# Patient Record
Sex: FEMALE | Race: WHITE | NOT HISPANIC OR LATINO | Employment: UNEMPLOYED | URBAN - METROPOLITAN AREA
[De-identification: names, ages, dates, MRNs, and addresses within clinical notes are randomized per-mention and may not be internally consistent; named-entity substitution may affect disease eponyms.]

---

## 2025-06-06 ENCOUNTER — APPOINTMENT (OUTPATIENT)
Dept: PRIMARY CARE | Facility: CLINIC | Age: 65
End: 2025-06-06
Payer: COMMERCIAL

## 2025-06-06 VITALS
HEART RATE: 65 BPM | TEMPERATURE: 97 F | BODY MASS INDEX: 24.16 KG/M2 | WEIGHT: 145 LBS | SYSTOLIC BLOOD PRESSURE: 152 MMHG | OXYGEN SATURATION: 98 % | DIASTOLIC BLOOD PRESSURE: 77 MMHG | HEIGHT: 65 IN

## 2025-06-06 DIAGNOSIS — L40.50 PSORIATIC ARTHRITIS (MULTI): ICD-10-CM

## 2025-06-06 DIAGNOSIS — E11.42 TYPE 2 DIABETES MELLITUS WITH DIABETIC POLYNEUROPATHY, WITHOUT LONG-TERM CURRENT USE OF INSULIN: ICD-10-CM

## 2025-06-06 DIAGNOSIS — I63.9 CEREBROVASCULAR ACCIDENT (CVA), UNSPECIFIED MECHANISM (MULTI): ICD-10-CM

## 2025-06-06 DIAGNOSIS — D50.8 IRON DEFICIENCY ANEMIA SECONDARY TO INADEQUATE DIETARY IRON INTAKE: ICD-10-CM

## 2025-06-06 DIAGNOSIS — F41.9 ANXIETY: ICD-10-CM

## 2025-06-06 DIAGNOSIS — Z76.89 ENCOUNTER TO ESTABLISH CARE WITH NEW PROVIDER: Primary | ICD-10-CM

## 2025-06-06 PROBLEM — D50.9 IRON DEFICIENCY ANEMIA: Status: ACTIVE | Noted: 2023-02-07

## 2025-06-06 PROBLEM — K21.9 GERD (GASTROESOPHAGEAL REFLUX DISEASE): Status: ACTIVE | Noted: 2025-06-06

## 2025-06-06 PROBLEM — E78.5 HYPERLIPIDEMIA: Status: ACTIVE | Noted: 2025-06-06

## 2025-06-06 PROBLEM — B02.9 SHINGLES: Status: ACTIVE | Noted: 2025-06-06

## 2025-06-06 PROBLEM — F32.A DEPRESSION: Status: ACTIVE | Noted: 2025-06-06

## 2025-06-06 PROBLEM — T45.4X5A ADVERSE EFFECT OF IRON AND ITS COMPOUNDS, INITIAL ENCOUNTER: Status: ACTIVE | Noted: 2023-02-07

## 2025-06-06 PROBLEM — E04.2 MULTIPLE THYROID NODULES: Status: ACTIVE | Noted: 2025-06-06

## 2025-06-06 PROBLEM — M19.90 ARTHRITIS: Status: ACTIVE | Noted: 2025-06-06

## 2025-06-06 PROBLEM — E03.9 HYPOTHYROIDISM: Status: ACTIVE | Noted: 2025-06-06

## 2025-06-06 PROBLEM — I10 HYPERTENSION: Status: ACTIVE | Noted: 2025-06-06

## 2025-06-06 PROBLEM — M79.7 FIBROMYALGIA: Status: ACTIVE | Noted: 2025-06-06

## 2025-06-06 PROBLEM — H40.9 GLAUCOMA: Status: ACTIVE | Noted: 2025-06-06

## 2025-06-06 PROCEDURE — 4004F PT TOBACCO SCREEN RCVD TLK: CPT | Performed by: NURSE PRACTITIONER

## 2025-06-06 PROCEDURE — 3077F SYST BP >= 140 MM HG: CPT | Performed by: NURSE PRACTITIONER

## 2025-06-06 PROCEDURE — 3078F DIAST BP <80 MM HG: CPT | Performed by: NURSE PRACTITIONER

## 2025-06-06 PROCEDURE — 3008F BODY MASS INDEX DOCD: CPT | Performed by: NURSE PRACTITIONER

## 2025-06-06 PROCEDURE — 99214 OFFICE O/P EST MOD 30 MIN: CPT | Performed by: NURSE PRACTITIONER

## 2025-06-06 RX ORDER — LEVOTHYROXINE SODIUM 137 UG/1
137 TABLET ORAL DAILY
COMMUNITY

## 2025-06-06 RX ORDER — DULOXETIN HYDROCHLORIDE 60 MG/1
60 CAPSULE, DELAYED RELEASE ORAL DAILY
COMMUNITY

## 2025-06-06 RX ORDER — ERGOCALCIFEROL 1.25 MG/1
1 CAPSULE ORAL WEEKLY
COMMUNITY
Start: 2018-01-15

## 2025-06-06 RX ORDER — ATORVASTATIN CALCIUM 20 MG/1
20 TABLET, FILM COATED ORAL DAILY
COMMUNITY

## 2025-06-06 RX ORDER — GABAPENTIN 300 MG/1
300 CAPSULE ORAL 3 TIMES DAILY
COMMUNITY

## 2025-06-06 RX ORDER — DEXTROMETHORPHAN HYDROBROMIDE, GUAIFENESIN 5; 100 MG/5ML; MG/5ML
650 LIQUID ORAL EVERY 8 HOURS PRN
COMMUNITY

## 2025-06-06 RX ORDER — FLUTICASONE PROPIONATE 50 MCG
2 SPRAY, SUSPENSION (ML) NASAL DAILY
COMMUNITY
Start: 2019-01-28

## 2025-06-06 RX ORDER — NAPROXEN 500 MG/1
500 TABLET ORAL
COMMUNITY
Start: 2019-01-28

## 2025-06-06 RX ORDER — VALACYCLOVIR HYDROCHLORIDE 1 G/1
500 TABLET, FILM COATED ORAL DAILY
COMMUNITY
Start: 2025-05-19

## 2025-06-06 RX ORDER — IBUPROFEN 600 MG/1
600 TABLET, FILM COATED ORAL EVERY 8 HOURS PRN
COMMUNITY
Start: 2019-01-28

## 2025-06-06 RX ORDER — LEVOTHYROXINE SODIUM 125 UG/1
1 TABLET ORAL DAILY
COMMUNITY

## 2025-06-06 RX ORDER — LISINOPRIL AND HYDROCHLOROTHIAZIDE 20; 25 MG/1; MG/1
1 TABLET ORAL DAILY
COMMUNITY

## 2025-06-06 RX ORDER — LATANOPROST 50 UG/ML
1 SOLUTION/ DROPS OPHTHALMIC NIGHTLY
COMMUNITY
Start: 2021-05-10

## 2025-06-06 RX ORDER — TRAZODONE HYDROCHLORIDE 100 MG/1
100 TABLET ORAL NIGHTLY
COMMUNITY
Start: 2025-04-29 | End: 2025-07-28

## 2025-06-06 RX ORDER — HYDROCHLOROTHIAZIDE 25 MG/1
25 TABLET ORAL EVERY MORNING
COMMUNITY
Start: 2020-11-12 | End: 2021-02-10 | Stop reason: WASHOUT

## 2025-06-06 RX ORDER — DOCUSATE SODIUM 100 MG/1
100 CAPSULE, LIQUID FILLED ORAL 2 TIMES DAILY
COMMUNITY

## 2025-06-06 RX ORDER — HYDROXYCHLOROQUINE SULFATE 200 MG/1
1 TABLET ORAL 2 TIMES DAILY
COMMUNITY
Start: 2020-11-12 | End: 2021-02-10

## 2025-06-06 RX ORDER — ASPIRIN 325 MG
325 TABLET ORAL DAILY
COMMUNITY

## 2025-06-06 RX ORDER — FOLIC ACID 1 MG/1
1 TABLET ORAL DAILY
COMMUNITY

## 2025-06-06 RX ORDER — OMEPRAZOLE 20 MG/1
40 CAPSULE, DELAYED RELEASE ORAL
COMMUNITY
Start: 2019-01-28

## 2025-06-06 ASSESSMENT — ENCOUNTER SYMPTOMS
OCCASIONAL FEELINGS OF UNSTEADINESS: 0
LOSS OF SENSATION IN FEET: 0
DEPRESSION: 0

## 2025-06-06 NOTE — PROGRESS NOTES
"Subjective   Patient ID: Lita Lomas is a 64 y.o. female who presents for New Patient Visit (To [facility]).    HPI   This is a pleasant 63 y/o female with PMH Gastric Bypass, HTN, HLD, DM, GERD, Hypothyroid, Goiter, Anxiety, Depression, Fibro, Psoriatic arthritis, CVA, Hypertensive retinopathy, Visual pathway disorder, Glaucoma who presents to [facility]    Annual due in September    Current concerns  CVA- 2020, with right vision loss resolved, still with memory loss, inability to concentrate, forgetfulness. At the time had been working in [employer], she is an RN. Caused her to have to quit working and is now on disability. Still struggling with no longer working, misses it which we discussed today.  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     Smoker- 1.5 ppd x 30, not interested in quitting, no LDLCT noted, discussed and will order at Annual    S/p gastric bypass- 2005, at the time weighed 450#, no longer treating diabetes    Iron anemia- history of transfusion, cannot tolerate po iron    , spouse on kidney transplant list, has had 3 calls with no match  no  Kids    Diet healthy  Caffeine 1 some days  Water 64 oz  Exercise no, discussed  Alcohol  No      Mammogram 2024  Cologuard, no results found  Follows with Rheumatology Dr Pyle  Ophthalmology Dr Pennington    Review of Systems  Review of Systems   Constitutional: Negative.    Respiratory: Negative.     Cardiovascular: Negative.    Gastrointestinal: Negative.    All other systems reviewed and are negative.    Objective   /77 (BP Location: Left arm, Patient Position: Sitting)   Pulse 65   Temp 36.1 °C (97 °F)   Ht 1.651 m (5' 5\")   Wt 65.8 kg (145 lb)   SpO2 98%   BMI 24.13 kg/m²     Physical Exam  Vitals reviewed.   Constitutional:       Appearance: Normal appearance.   Cardiovascular:      Rate and Rhythm: Normal rate. Rhythm irregular.      Heart sounds: Normal heart sounds.   Pulmonary:     "  Effort: Pulmonary effort is normal.      Breath sounds: Normal breath sounds.   Musculoskeletal:         General: Normal range of motion.      Cervical back: Normal range of motion and neck supple.   Neurological:      Mental Status: She is alert.   Psychiatric:         Mood and Affect: Mood normal.         Assessment/Plan   Problem List Items Addressed This Visit           ICD-10-CM    Anxiety F41.9    Handouts provided and reviewed with patient, discussed           CVA (cerebral vascular accident) (Multi) I63.9    Psoriatic arthritis (Multi) L40.50    Encounter to establish care with new provider - Primary Z76.89    Meds, labs, chart reviewed  Follow up for Annual, fasting labs, screenings  Check iron         Iron deficiency anemia D50.9    Type 2 diabetes mellitus with diabetic polyneuropathy, without long-term current use of insulin E11.42    Resolved with Gastric bypass, down 300#

## 2025-06-06 NOTE — PATIENT INSTRUCTIONS
Health Maintenance  Continue to follow a healthy diet with fruits and vegetables at most meals.  Daily exercise is recommended as well as adding weights 3 times a week to maintain muscle mass and for bone health.  It is recommended you drink 6 to 8 glasses of water daily, more when you are exerting yourself or in hot weather.  Make sure you follow the health screening guidelines and keep up to date on your immunizations!    Experiencing occasional anxiety is a normal part of life    Anxiety is not always related to an underlying condition. It may be caused by:   * Stress from work, school, or personal relationships  * Emotional trauma   * Financial concerns   * Stress caused by a chronic or serious medical condition   * A major event or performance such as a wedding, move, birth  * Side effect of certain medications   * Alcohol consumption, drugs such as cocaine   * Lack of oxygen     Treatment for Anxiety can include  * Exercise daily   * Maintain a positive attitude   * Get enough sleep   * Learn what triggers your anxiety and alleviating it   * Eat a well balanced diet  * Practicing relaxation techniques such as yoga   * Stopping smoking   * Limiting consumption of caffeine and alcohol    See a doctor if you notice your anxiety is interfering with daily tasks. They can refer you to a Psychiatrist to evaluate if a medication would be helpful for you  This is especially important if your anxiety is accompanied by Insomnia, Depression, or Suicidal thoughts

## 2025-10-08 ENCOUNTER — APPOINTMENT (OUTPATIENT)
Dept: PRIMARY CARE | Facility: CLINIC | Age: 65
End: 2025-10-08
Payer: COMMERCIAL